# Patient Record
Sex: FEMALE | Race: WHITE | NOT HISPANIC OR LATINO | Employment: FULL TIME | URBAN - METROPOLITAN AREA
[De-identification: names, ages, dates, MRNs, and addresses within clinical notes are randomized per-mention and may not be internally consistent; named-entity substitution may affect disease eponyms.]

---

## 2019-11-21 ENCOUNTER — TELEPHONE (OUTPATIENT)
Dept: ORTHOPEDICS | Facility: CLINIC | Age: 39
End: 2019-11-21

## 2019-11-21 NOTE — TELEPHONE ENCOUNTER
----- Message from Deanna Arevalo MA sent at 11/21/2019  3:27 PM CST -----  Contact: Self/[phone]  PT broke her middle finger on her L hand was told to follow up with ortho. PT went to Urgent Care today about 12 noon